# Patient Record
Sex: MALE | Race: WHITE | NOT HISPANIC OR LATINO | URBAN - METROPOLITAN AREA
[De-identification: names, ages, dates, MRNs, and addresses within clinical notes are randomized per-mention and may not be internally consistent; named-entity substitution may affect disease eponyms.]

---

## 2018-09-14 ENCOUNTER — EMERGENCY (EMERGENCY)
Facility: HOSPITAL | Age: 28
LOS: 1 days | Discharge: HOME | End: 2018-09-14
Admitting: EMERGENCY MEDICINE
Payer: COMMERCIAL

## 2018-09-14 VITALS
SYSTOLIC BLOOD PRESSURE: 110 MMHG | WEIGHT: 190.7 LBS | RESPIRATION RATE: 16 BRPM | DIASTOLIC BLOOD PRESSURE: 68 MMHG | OXYGEN SATURATION: 98 % | TEMPERATURE: 98 F | HEART RATE: 72 BPM

## 2018-09-14 DIAGNOSIS — R51 HEADACHE: ICD-10-CM

## 2018-09-14 DIAGNOSIS — R42 DIZZINESS AND GIDDINESS: ICD-10-CM

## 2018-09-14 DIAGNOSIS — R11.0 NAUSEA: ICD-10-CM

## 2018-09-14 DIAGNOSIS — Z79.899 OTHER LONG TERM (CURRENT) DRUG THERAPY: ICD-10-CM

## 2018-09-14 PROCEDURE — 99283 EMERGENCY DEPT VISIT LOW MDM: CPT

## 2018-09-14 RX ORDER — ONDANSETRON 8 MG/1
1 TABLET, FILM COATED ORAL
Qty: 12 | Refills: 0 | OUTPATIENT
Start: 2018-09-14

## 2018-09-14 RX ORDER — MECLIZINE HCL 12.5 MG
1 TABLET ORAL
Qty: 12 | Refills: 0 | OUTPATIENT
Start: 2018-09-14

## 2018-09-14 NOTE — ED ADULT TRIAGE NOTE - CHIEF COMPLAINT QUOTE
"elbowed " in right temple when playing basketball on Saturday - no LOC; went to see MD and told had a "mild concussion"; continues to have intermittent HA and nausea;

## 2018-09-14 NOTE — ED PROVIDER NOTE - OBJECTIVE STATEMENT
28 y/o m no pmh presents stating was elbowed in the head 6 days ago while playing basketball.  Pt stating since then, has had intermittent dizziness described as the room spinning, worse with changing position.  Pt having intermittent nausea, headache, difficulty concentrating on a screen.  Denies visual changes, weakness, numbness/tingling, all other ROS negative.

## 2018-09-14 NOTE — ED ADULT NURSE NOTE - NSIMPLEMENTINTERV_GEN_ALL_ED
Implemented All Universal Safety Interventions:  [place] to call system. Call bell, personal items and telephone within reach. Instruct patient to call for assistance. Room bathroom lighting operational. Non-slip footwear when patient is off stretcher. Physically safe environment: no spills, clutter or unnecessary equipment. Stretcher in lowest position, wheels locked, appropriate side rails in place.
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
## 2018-09-14 NOTE — ED PROVIDER NOTE - MEDICAL DECISION MAKING DETAILS
28 y/o m no pmh presents with dizziness, nausea, headache for the past 6 days after being elbowed in head while playing basketball; no neuro deficits, does not meet CT imaging criteria, is ambulatory with steady gait, sx consistent with concussion, will rx zofran and meclizine, encouraged oral hydration, f/u pmd, return to ED if sx worsen.

## 2021-12-06 NOTE — ED PROVIDER NOTE - NEUROLOGICAL, MLM
Price (Do Not Change): 0.00
Detail Level: Zone
Instructions: This plan will send the code FBSE to the PM system.  DO NOT or CHANGE the price.
Alert and oriented, no focal deficits, no motor or sensory deficits.  CN II-XII intact, strength 5/5 b/l upper and lower ext, sensation intact distally b/l upper and lower ext
